# Patient Record
(demographics unavailable — no encounter records)

---

## 2024-12-11 NOTE — ASSESSMENT
[FreeTextEntry1] : 55 M here for anal pain and something that protrudes when he has a bowel movement. On exam, no clear anal fissure seen; GABI and anoscopy not possible because of pain. Plan: Patient requested to try and take a picture of the protrusion (when it happens next).  Will need a procedure once we see the protrusion. High fiber diet. Stool softeners as needed. All questions answered.

## 2024-12-11 NOTE — HISTORY OF PRESENT ILLNESS
[FreeTextEntry1] : 55-year-old male pt here with complaints of anal fissure was referred through a friend, Waylon Don  Colonoscopy 1/19/24 history of colon polyps, father has colon cancer Impression: Grade I external hemorrhoids Normal mucosa in the whole colon Repeat in 5 years Small anal skin tag.  Had an anal fissure 2 years ago that never fully healed. Was given NTG cream  When he has a BM there is skin that protrudes and goes back in that's painful Occasionally bleeds Does sitz baths Takes Metamucil once a day Drinks plenty of water Moves his bowels regularly The pain will last long after the BM sometimes several hours after a BM,

## 2024-12-11 NOTE — PHYSICAL EXAM
[No HSM] : no hepatosplenomegaly [No Rash or Lesion] : No rash or lesion [Alert] : alert [Oriented to Person] : oriented to person [Oriented to Place] : oriented to place [Oriented to Time] : oriented to time [Calm] : calm [Abdomen Masses] : No abdominal masses [Abdomen Tenderness] : ~T No ~M abdominal tenderness [de-identified] : Soft [de-identified] : External - small anal skin tag; no fissure clearly seen; GABI and anoscopy not possible because of pain [de-identified] : Normal [de-identified] : Normal [de-identified] : Normal [de-identified] : Normal [de-identified] : Normal

## 2024-12-11 NOTE — PHYSICAL EXAM
[No HSM] : no hepatosplenomegaly [No Rash or Lesion] : No rash or lesion [Alert] : alert [Oriented to Person] : oriented to person [Oriented to Place] : oriented to place [Oriented to Time] : oriented to time [Calm] : calm [Abdomen Masses] : No abdominal masses [Abdomen Tenderness] : ~T No ~M abdominal tenderness [de-identified] : Soft [de-identified] : External - small anal skin tag; no fissure clearly seen; GABI and anoscopy not possible because of pain [de-identified] : Normal [de-identified] : Normal [de-identified] : Normal [de-identified] : Normal [de-identified] : Normal

## 2025-02-12 NOTE — PHYSICAL EXAM
[No Rash or Lesion] : No rash or lesion [Alert] : alert [Oriented to Person] : oriented to person [Oriented to Place] : oriented to place [Oriented to Time] : oriented to time [Calm] : calm [de-identified] : Normal [de-identified] : surgical sites - clean and healing well [de-identified] : Normal [de-identified] : Normal [de-identified] : Normal [de-identified] : Normal [de-identified] : Normal

## 2025-02-12 NOTE — HISTORY OF PRESENT ILLNESS
[FreeTextEntry1] : 55-year-old male pt s/p anoscopy, excision of anal polyp and hypertrophied anal papilla, Botox injection, excision of anal skin tags on 1/27/25 Here for post op visit. Doing well; still feels like a fissure is still there. No bleeding; takes tylenol or advil as needed. On stool softeners and reporting one bowel movement/day. I reviewed and discussed the pathology results with him.

## 2025-02-12 NOTE — PHYSICAL EXAM
[No Rash or Lesion] : No rash or lesion [Alert] : alert [Oriented to Person] : oriented to person [Oriented to Place] : oriented to place [Oriented to Time] : oriented to time [Calm] : calm [de-identified] : Normal [de-identified] : surgical sites - clean and healing well [de-identified] : Normal [de-identified] : Normal [de-identified] : Normal [de-identified] : Normal [de-identified] : Normal

## 2025-02-12 NOTE — ASSESSMENT
[FreeTextEntry1] : 55 M here for postop visit. He is s/p anoscopy, botox injection, excision of anal polyp and anal skin tags. He is doing well; has mild residual pain; and on exam, the surgical sites are clean and healing well. Plan: Sitz baths. High fiber diet. Stool softeners as needed. See again after 4 weeks. All questions answered.